# Patient Record
Sex: FEMALE | Race: WHITE | NOT HISPANIC OR LATINO | URBAN - METROPOLITAN AREA
[De-identification: names, ages, dates, MRNs, and addresses within clinical notes are randomized per-mention and may not be internally consistent; named-entity substitution may affect disease eponyms.]

---

## 2018-09-11 ENCOUNTER — IMPORTED ENCOUNTER (OUTPATIENT)
Dept: URBAN - METROPOLITAN AREA CLINIC 31 | Facility: CLINIC | Age: 33
End: 2018-09-11

## 2018-09-11 PROCEDURE — 92015 DETERMINE REFRACTIVE STATE: CPT

## 2018-09-11 PROCEDURE — 92014 COMPRE OPH EXAM EST PT 1/>: CPT

## 2021-02-03 NOTE — PATIENT DISCUSSION
HVF 24-2 shows evidence of glaucoma. Rec: lower IOP OU. Discussed adding treatment: Lumigan vs SLT. Pt elects SLT OU. Plan SLT OU next visit.

## 2021-02-10 NOTE — PROCEDURE NOTE: CLINICAL
PROCEDURE NOTE: SLT #1 OU. Diagnosis: POAG, Mild. Anesthesia: Topical. Prep: Alphagan 0.15%. Prior to treatment, risks/benefits/alternatives discussed including infection, loss of vision, hemorrhage, cataract, glaucoma, retinal tears or detachment. Lens:  SLT laser lens with goniosol. Power: 1.2mJ. Total applications: 089/491. Application 782 degrees. Patient tolerated procedure well. There were no complications. Post-op instructions given. Post-op IOP = 23/19 mmHg. 1 gtt Combigan instilled OU. TA after =.

## 2021-02-10 NOTE — PATIENT DISCUSSION
HVF 24-2 shows evidence of glaucoma. Rec: lower IOP OU. Discussed adding treatment: Lumigan vs SLT. Pt elects SLT OU.

## 2022-04-02 ASSESSMENT — VISUAL ACUITY
OD_PH: SC 20/30
OD_SC: J1+14''
OS_CC: 20/100
OD_CC: 20/80
OS_SC: J1+14''
OS_PH: SC 20/30

## 2022-04-02 ASSESSMENT — TONOMETRY
OS_IOP_MMHG: 12
OD_IOP_MMHG: 12

## 2022-07-09 ENCOUNTER — TELEPHONE ENCOUNTER (OUTPATIENT)
Dept: URBAN - METROPOLITAN AREA CLINIC 121 | Facility: CLINIC | Age: 37
End: 2022-07-09

## 2022-07-09 RX ORDER — LEVOTHYROXINE SODIUM 50 UG/1
TABLET ORAL ONCE A DAY
Refills: 0 | OUTPATIENT
Start: 2017-03-04 | End: 2017-05-30

## 2022-07-09 RX ORDER — ACETAMINOPHEN, DEXTROMETHORPHAN HYDROBROMIDE, DOXYLAMINE SUCCINATE, AND PHENYLEPHRINE HYDROCHLORIDE 10; 12.5; 20; 65 MG/30ML; MG/30ML; MG/30ML; MG/30ML
SOLUTION ORAL AS NEEDED
Refills: 0 | OUTPATIENT
Start: 2017-05-30 | End: 2017-07-31

## 2022-07-09 RX ORDER — LEVOTHYROXINE SODIUM 50 UG/1
TABLET ORAL ONCE A DAY
Refills: 0 | OUTPATIENT
Start: 2017-05-30 | End: 2017-07-31

## 2022-07-09 RX ORDER — ACETAMINOPHEN, DEXTROMETHORPHAN HYDROBROMIDE, DOXYLAMINE SUCCINATE, AND PHENYLEPHRINE HYDROCHLORIDE 10; 12.5; 20; 65 MG/30ML; MG/30ML; MG/30ML; MG/30ML
SOLUTION ORAL AS NEEDED
Refills: 0 | OUTPATIENT
Start: 2017-05-01 | End: 2017-05-30

## 2022-07-10 ENCOUNTER — TELEPHONE ENCOUNTER (OUTPATIENT)
Dept: URBAN - METROPOLITAN AREA CLINIC 121 | Facility: CLINIC | Age: 37
End: 2022-07-10

## 2022-07-10 RX ORDER — ACETAMINOPHEN, DEXTROMETHORPHAN HYDROBROMIDE, DOXYLAMINE SUCCINATE, AND PHENYLEPHRINE HYDROCHLORIDE 10; 12.5; 20; 65 MG/30ML; MG/30ML; MG/30ML; MG/30ML
SOLUTION ORAL AS NEEDED
Refills: 0 | Status: ACTIVE | COMMUNITY
Start: 2017-07-31

## 2022-07-10 RX ORDER — LEVOTHYROXINE SODIUM 50 UG/1
TABLET ORAL ONCE A DAY
Refills: 0 | Status: ACTIVE | COMMUNITY
Start: 2017-07-31